# Patient Record
Sex: MALE | Race: WHITE
[De-identification: names, ages, dates, MRNs, and addresses within clinical notes are randomized per-mention and may not be internally consistent; named-entity substitution may affect disease eponyms.]

---

## 2017-08-08 ENCOUNTER — HOSPITAL ENCOUNTER (EMERGENCY)
Dept: HOSPITAL 75 - ER | Age: 27
Discharge: HOME | End: 2017-08-08
Payer: COMMERCIAL

## 2017-08-08 VITALS — SYSTOLIC BLOOD PRESSURE: 126 MMHG | DIASTOLIC BLOOD PRESSURE: 80 MMHG

## 2017-08-08 VITALS — BODY MASS INDEX: 23.86 KG/M2 | WEIGHT: 180 LBS | HEIGHT: 73 IN

## 2017-08-08 DIAGNOSIS — R07.1: Primary | ICD-10-CM

## 2017-08-08 DIAGNOSIS — Z82.49: ICD-10-CM

## 2017-08-08 LAB
ALBUMIN SERPL-MCNC: 4.3 GM/DL (ref 3.2–4.5)
ALT SERPL-CCNC: 59 U/L (ref 0–55)
ANION GAP SERPL CALC-SCNC: 12 MMOL/L (ref 5–14)
AST SERPL-CCNC: 31 U/L (ref 5–34)
BASOPHILS # BLD AUTO: 0 10^3/UL (ref 0–0.1)
BASOPHILS NFR BLD AUTO: 0 % (ref 0–10)
BILIRUB SERPL-MCNC: 0.4 MG/DL (ref 0.1–1)
BUN SERPL-MCNC: 12 MG/DL (ref 7–18)
BUN/CREAT SERPL: 11
CALCIUM SERPL-MCNC: 9.4 MG/DL (ref 8.5–10.1)
CHLORIDE SERPL-SCNC: 106 MMOL/L (ref 98–107)
CO2 SERPL-SCNC: 24 MMOL/L (ref 21–32)
CREAT SERPL-MCNC: 1.06 MG/DL (ref 0.6–1.3)
EOSINOPHIL # BLD AUTO: 0.1 10^3/UL (ref 0–0.3)
EOSINOPHIL NFR BLD AUTO: 3 % (ref 0–10)
ERYTHROCYTE [DISTWIDTH] IN BLOOD BY AUTOMATED COUNT: 13.1 % (ref 10–14.5)
GFR SERPLBLD BASED ON 1.73 SQ M-ARVRAT: > 60 ML/MIN
GLUCOSE SERPL-MCNC: 104 MG/DL (ref 70–105)
LIPASE SERPL-CCNC: 24 U/L (ref 8–78)
LYMPHOCYTES # BLD AUTO: 1.1 X 10^3 (ref 1–4)
LYMPHOCYTES NFR BLD AUTO: 32 % (ref 12–44)
MCH RBC QN AUTO: 30 PG (ref 25–34)
MCHC RBC AUTO-ENTMCNC: 34 G/DL (ref 32–36)
MCV RBC AUTO: 91 FL (ref 80–99)
MONOCYTES # BLD AUTO: 0.4 X 10^3 (ref 0–1)
MONOCYTES NFR BLD AUTO: 12 % (ref 0–12)
NEUTROPHILS # BLD AUTO: 1.8 X 10^3 (ref 1.8–7.8)
NEUTROPHILS NFR BLD AUTO: 53 % (ref 42–75)
PLATELET # BLD: 190 10^3/UL (ref 130–400)
PMV BLD AUTO: 11.1 FL (ref 7.4–10.4)
POTASSIUM SERPL-SCNC: 4 MMOL/L (ref 3.6–5)
PROT SERPL-MCNC: 6.9 GM/DL (ref 6.4–8.2)
RBC # BLD AUTO: 4.73 10^6/UL (ref 4.35–5.85)
SODIUM SERPL-SCNC: 142 MMOL/L (ref 135–145)
TROPONIN I SERPL-MCNC: < 0.3 NG/ML (ref ?–0.3)
WBC # BLD AUTO: 3.4 10^3/UL (ref 4.3–11)

## 2017-08-08 PROCEDURE — 93005 ELECTROCARDIOGRAM TRACING: CPT

## 2017-08-08 PROCEDURE — 71020: CPT

## 2017-08-08 PROCEDURE — 80053 COMPREHEN METABOLIC PANEL: CPT

## 2017-08-08 PROCEDURE — 85025 COMPLETE CBC W/AUTO DIFF WBC: CPT

## 2017-08-08 PROCEDURE — 36415 COLL VENOUS BLD VENIPUNCTURE: CPT

## 2017-08-08 PROCEDURE — 84484 ASSAY OF TROPONIN QUANT: CPT

## 2017-08-08 PROCEDURE — 83690 ASSAY OF LIPASE: CPT

## 2017-08-08 PROCEDURE — 85379 FIBRIN DEGRADATION QUANT: CPT

## 2017-08-08 PROCEDURE — 96374 THER/PROPH/DIAG INJ IV PUSH: CPT

## 2017-08-08 NOTE — DIAGNOSTIC IMAGING REPORT
PA and lateral views of the chest



Indication:  Chest pain



Findings: The lungs are clear. The heart size is normal. There is

no effusion or pneumothorax



The mediastinum and toby appear unremarkable.



Impression: Unremarkable study.



Dictated by: 



  Dictated on workstation # [id number]

## 2017-08-08 NOTE — ED CHEST PAIN
General


Chief Complaint:  Chest Pain


Stated Complaint:  CP


Nursing Triage Note:  


c/o intermittant chest pain since Sat.


Nursing Sepsis Screen:  No Definite Risk


Source:  patient


Exam Limitations:  no limitations





History of Present Illness


Time seen by provider:  07:10


Initial Comments


Here with report of left-sided lower chest pain that appears to be the chest 

wall.  It starts the lower sternal border and radiates left word along the 

lower rib margin.  Somewhat reproducible.  Worse with deep breathing.  

Sometimes worse with movement.  This is been going on for 2 days.  Much worse 

this morning.  He tried to go to his primary clinic and they referred him to 

the ER for evaluation due to chest pain.


Timing/Duration:  2-3 days


Severity/Quality:  moderate, sharp


Location:  central


Radiation:  other (lateral)


Activities at Onset:  none


Prior CP/Workup:  no prior chest pain


ASA po PTA:  No


NTG SL PTA:  No


Associated Symptoms:  No abdominal pain, No back pain, No fever/chills, No 

nausea/vomiting, No shortness of breath, No syncope, No weakness





Allergies and Home Medications


Allergies


Coded Allergies:  


     erythromycin base (Verified  Allergy, Unknown, 16)


     pertussis vaccine,fluid (Verified  Allergy, Unknown, 16)





Review of Systems


Constitutional:  see HPI, No chills, No fever


EENTM:  No Symptoms Reported


Respiratory:  See HPI, Denies Shortness of Air, Denies Wheezing


Cardiovascular:  See HPI, Denies Edema, Denies Irregular Heart Rate


Gastrointestinal:  No Symptoms Reported


Genitourinary:  No Symptoms Reported


Musculoskeletal:  see HPI, No back pain, muscle pain


Skin:  no symptoms reported





All Other Systems Reviewed


Negative Unless Noted:  Yes





Past Medical-Social-Family Hx


Patient Social History


Alcohol Use:  Occasionally Uses


Recreational Drug Use:  No


Smoking Status:  Never a Smoker


Recent Foreign Travel:  No


Contact w/Someone Who Travel:  No


Recent Infectious Disease Expo:  No





Immunizations Up To Date


Tetanus Booster (TDap):  Unknown


Date of Influenza Vaccine:  Oct 3, 2013





Surgeries


HX Surgeries:  No





Respiratory


Hx Respiratory Disorders:  No





Cardiovascular


Hx Cardiac Disorders:  No





Neurological


Hx Neurological Disorders:  No





Reviewed Nursing Assessment


Reviewed/Agree w Nursing PMH:  Yes





Family Medical History


Significant Family History:  Heart Disease, Stroke





Physical Exam


Vital Signs





Vital Sign - Last 12Hours








 17





 07:08


 


Temp 98.0


 


Resp 16


 


B/P (MAP) 128/82


 


Pulse Ox 98


 


O2 Delivery Room Air





Capillary Refill : Less Than 3 Seconds


General Appearance:  No Apparent Distress, WD/WN


HEENT:  PERRL/EOMI, Pharynx Normal


Neck:  Non Tender, Supple


Respiratory:  Lungs Clear, Normal Breath Sounds


Cardiovascular:  Regular Rate, Rhythm, No Murmur


Gastrointestinal:  Non Tender, Soft


Extremity:  Non Tender, No Calf Tenderness


Neurologic/Psychiatric:  Alert, Oriented x3


Skin:  Normal Color, Warm/Dry





Progress/Results/Core Measures


Results/Orders


Lab Results





Laboratory Tests








Test


  17


07:35 Range/Units


 


 


White Blood Count


  3.4 L


  4.3-11.0


10^3/uL


 


Red Blood Count


  4.73 


  4.35-5.85


10^6/uL


 


Hemoglobin 14.4  13.3-17.7  G/DL


 


Hematocrit 43  40-54  %


 


Mean Corpuscular Volume 91  80-99  FL


 


Mean Corpuscular Hemoglobin 30  25-34  PG


 


Mean Corpuscular Hemoglobin


Concent 34 


  32-36  G/DL


 


 


Red Cell Distribution Width 13.1  10.0-14.5  %


 


Platelet Count


  190 


  130-400


10^3/uL


 


Mean Platelet Volume 11.1 H 7.4-10.4  FL


 


Neutrophils (%) (Auto) 53  42-75  %


 


Lymphocytes (%) (Auto) 32  12-44  %


 


Monocytes (%) (Auto) 12  0-12  %


 


Eosinophils (%) (Auto) 3  0-10  %


 


Basophils (%) (Auto) 0  0-10  %


 


Neutrophils # (Auto) 1.8  1.8-7.8  X 10^3


 


Lymphocytes # (Auto) 1.1  1.0-4.0  X 10^3


 


Monocytes # (Auto) 0.4  0.0-1.0  X 10^3


 


Eosinophils # (Auto)


  0.1 


  0.0-0.3


10^3/uL


 


Basophils # (Auto)


  0.0 


  0.0-0.1


10^3/uL


 


Sodium Level 142  135-145  MMOL/L


 


Potassium Level 4.0  3.6-5.0  MMOL/L


 


Chloride Level 106    MMOL/L


 


Carbon Dioxide Level 24  21-32  MMOL/L


 


Anion Gap 12  5-14  MMOL/L


 


Blood Urea Nitrogen 12  7-18  MG/DL


 


Creatinine


  1.06 


  0.60-1.30


MG/DL


 


Estimat Glomerular Filtration


Rate > 60 


   


 


 


BUN/Creatinine Ratio 11   


 


Glucose Level 104    MG/DL


 


Calcium Level 9.4  8.5-10.1  MG/DL


 


Total Bilirubin 0.4  0.1-1.0  MG/DL


 


Aspartate Amino Transf


(AST/SGOT) 31 


  5-34  U/L


 


 


Alanine Aminotransferase


(ALT/SGPT) 59 H


  0-55  U/L


 


 


Alkaline Phosphatase 93    U/L


 


Troponin I < 0.30  <0.30  NG/ML


 


Total Protein 6.9  6.4-8.2  GM/DL


 


Albumin 4.3  3.2-4.5  GM/DL


 


Lipase 24  8-78  U/L








My Orders





Orders - CELSO HERNANDEZ MD


Ekg Tracing (17 07:13)


Cbc With Automated Diff (17 07:20)


Comprehensive Metabolic Panel (17 07:20)


Fibrin Degradation Products (17 07:20)


Lipase (17 07:20)


Troponin I (17 07:20)


Chest Pa/Lat (2 View) (17 07:20)


Saline Lock/Iv-Start (17 07:20)


Aspirin Chewable Tablet (Baby Aspirin Ch (17 07:20)


Ketorolac Injection (Toradol Injection) (17 07:20)





Vital Signs/I&O





Vital Sign - Last 12Hours








 17





 07:08 07:31 07:39


 


Temp 98.0 98.0 98.0


 


Resp 16  


 


B/P (MAP) 128/82  


 


Pulse Ox 98  


 


O2 Delivery Room Air  














Blood Pressure Mean:  97








Progress Note :  


Progress Note


Seen and evaluated.  IV, labs, EKG and chest x-ray ordered.   mg by 

mouth given.  Toradol 30 mg IV given.  Monitor patient.  0840: No acute 

findings.  Pain is improved somewhat with the Toradol.  Discharged home with 

return precautions.  Patient verbalize understanding instructions and agreement 

with plan.





ECG


Initial ECG Impression Date:  Aug 8, 2017


Initial ECG Impression Time:  07:17


Initial ECG Rate:  62


Initial ECG Rhythm:  Normal Sinus


Initial ECG Impression:  Normal


Initial ECG Comparisson:  No Previous ECG Available


Comment


Sinus rhythm with normal axis.  No evidence of ST elevation MI.  No previous 

available for comparison.  Interpreted by me.





Diagnostic Imaging





   Diagonstic Imaging:  Xray


   Plain Films/CT/US/NM/MRI:  chest


Comments


NAME:      MARIO VORA


MED REC#:   [id number]


ACCOUNT#:   [id number]


PT STATUS:   REG ER


:      1990


PHYSICIAN:    CELSO HERNANDEZ MD


ADMIT DATE:   17/ER


 ***Signed***


Date of Exam:   17





CHEST PA/LAT (2 VIEW)


 





PA and lateral views of the chest





Indication:  Chest pain





Findings: The lungs are clear. The heart size is normal. There is


no effusion or pneumothorax





The mediastinum and toby appear unremarkable.





Impression: Unremarkable study.





Dictated by: 





  Dictated on workstation # [id number]





[id number]





Dict:      17


Trans:      17





Interpreted by:         ALECIA CALLAHAN MD


Electronically signed by:   ALECIA CALLAHAN MD 17





Departure


Impression


Impression:  


 Primary Impression:  


 Chest wall pain


 Additional Impression:  


 Pleuritic chest pain


Disposition:  01 HOME, SELF-CARE


Condition:  Improved





Departure-Patient Inst.


Decision time for Depature:  08:43


Referrals:  


SHILPI RODRIGES MD (PCP/Family)


Primary Care Physician


Patient Instructions:  Pleuritic Chest Pain (DC), Costochondritis (DC)





Add. Discharge Instructions:  


All discharge instructions reviewed with patient and/or family. Voiced 

understanding.





You may take ibuprofen 600 mg every 6-8 hours as needed for pain.  You may take 

Tylenol 1000 mg every 8 hours as needed for pain.  You may try over-the-counter 

icy hot patch with lidocaine or salon pas patches with lidocaine over the area 

of concern per package directions.  Drink plenty of fluids.  Return for worse 

pain, fever, vomiting, weakness, breathing problems or other concerns as 

needed.  Follow-up with your DrTrisha in a few days for recheck.





Copy


Copies To 1:   SHILPI RODRIGES MD, TIMOTHY D MD Aug 8, 2017 08:23

## 2022-01-25 ENCOUNTER — HOSPITAL ENCOUNTER (OUTPATIENT)
Dept: HOSPITAL 75 - RAD | Age: 32
End: 2022-01-25
Attending: FAMILY MEDICINE
Payer: COMMERCIAL

## 2022-01-25 DIAGNOSIS — R91.8: Primary | ICD-10-CM

## 2022-01-25 DIAGNOSIS — R06.00: ICD-10-CM

## 2022-01-25 PROCEDURE — 71046 X-RAY EXAM CHEST 2 VIEWS: CPT

## 2022-01-25 NOTE — DIAGNOSTIC IMAGING REPORT
INDICATION: Dyspnea.



PA and lateral views of the chest are obtained with comparison

made to study of 08/08/2017.



FINDINGS: Heart size and pulmonary vascularity remain within

normal limits; however, there are now patchy perihilar densities

which may represent pneumonitis or atypical pneumonia. No

pneumothorax or significant pleural fluid is seen.



IMPRESSION: Patchy predominantly perihilar infiltrates which may

reflect pneumonitis or atypical pneumonia.



Dictated by: 



  Dictated on workstation # [id number]